# Patient Record
Sex: MALE | Race: WHITE | URBAN - NONMETROPOLITAN AREA
[De-identification: names, ages, dates, MRNs, and addresses within clinical notes are randomized per-mention and may not be internally consistent; named-entity substitution may affect disease eponyms.]

---

## 2024-12-04 ENCOUNTER — OFFICE VISIT (OUTPATIENT)
Dept: URGENT CARE | Age: 24
End: 2024-12-04

## 2024-12-04 VITALS
HEART RATE: 82 BPM | HEIGHT: 69 IN | SYSTOLIC BLOOD PRESSURE: 146 MMHG | RESPIRATION RATE: 16 BRPM | DIASTOLIC BLOOD PRESSURE: 81 MMHG | OXYGEN SATURATION: 97 % | TEMPERATURE: 98.3 F | BODY MASS INDEX: 34.07 KG/M2 | WEIGHT: 230 LBS

## 2024-12-04 DIAGNOSIS — J01.00 ACUTE MAXILLARY SINUSITIS, RECURRENCE NOT SPECIFIED: ICD-10-CM

## 2024-12-04 DIAGNOSIS — R11.2 NAUSEA AND VOMITING, UNSPECIFIED VOMITING TYPE: Primary | ICD-10-CM

## 2024-12-04 DIAGNOSIS — R05.9 COUGH, UNSPECIFIED TYPE: ICD-10-CM

## 2024-12-04 DIAGNOSIS — H65.00 ACUTE SEROUS OTITIS MEDIA, RECURRENCE NOT SPECIFIED, UNSPECIFIED LATERALITY: ICD-10-CM

## 2024-12-04 LAB
POC RAPID INFLUENZA A: NEGATIVE
POC RAPID INFLUENZA B: NEGATIVE
POC SARS-COV-2 AG BINAX: NORMAL

## 2024-12-04 RX ORDER — AMOXICILLIN 875 MG/1
875 TABLET, FILM COATED ORAL 2 TIMES DAILY
Qty: 20 TABLET | Refills: 0 | Status: SHIPPED | OUTPATIENT
Start: 2024-12-04 | End: 2024-12-14

## 2024-12-04 ASSESSMENT — ENCOUNTER SYMPTOMS
FATIGUE: 1
PSYCHIATRIC NEGATIVE: 1
COUGH: 1
HEADACHES: 1
SINUS PAIN: 1
CARDIOVASCULAR NEGATIVE: 1
HEMATOLOGIC/LYMPHATIC NEGATIVE: 1
SINUS PRESSURE: 1
MUSCULOSKELETAL NEGATIVE: 1
RHINORRHEA: 1
ENDOCRINE NEGATIVE: 1
ALLERGIC/IMMUNOLOGIC NEGATIVE: 1
EYES NEGATIVE: 1
VOMITING: 1

## 2024-12-04 NOTE — PROGRESS NOTES
Subjective   Patient ID: Gavin Arana is a 23 y.o. male. They present today with a chief complaint of Nasal Congestion, Earache (Bilateral ear pressure), Cough, Nausea, Headache, and Vomiting (Symptoms since sunday).    History of Present Illness    History provided by:  Patient   used: No    Earache   There is pain in both ears. The current episode started in the past 7 days. The problem occurs constantly. The problem has been unchanged. There has been no fever. The fever has been present for 3 to 4 days. The pain is moderate. Associated symptoms include coughing, headaches, rhinorrhea and vomiting. He has tried NSAIDs for the symptoms. The treatment provided mild relief.   Cough  The current episode started in the past 7 days. The problem has been unchanged. Associated symptoms include ear pain, headaches, nasal congestion, postnasal drip and rhinorrhea. The treatment provided no relief.   Headache  Pain location:  Generalized  Progression:  Worsening  Associated symptoms: cough, drainage, ear pain, fatigue, sinus pressure and vomiting    Vomiting  Associated symptoms: cough and headaches        Past Medical History  Allergies as of 12/04/2024    (No Known Allergies)       (Not in a hospital admission)       No past medical history on file.    No past surgical history on file.     reports that he has been smoking cigarettes. He has quit using smokeless tobacco. He reports current alcohol use. He reports current drug use. Drug: Marijuana.    Review of Systems  Review of Systems   Constitutional:  Positive for fatigue.   HENT:  Positive for ear pain, postnasal drip, rhinorrhea, sinus pressure and sinus pain.    Eyes: Negative.    Respiratory:  Positive for cough.    Cardiovascular: Negative.    Gastrointestinal:  Positive for vomiting.   Endocrine: Negative.    Genitourinary: Negative.    Musculoskeletal: Negative.    Skin: Negative.    Allergic/Immunologic: Negative.    Neurological:   "Positive for headaches.   Hematological: Negative.    Psychiatric/Behavioral: Negative.     All other systems reviewed and are negative.                                 Objective    Vitals:    12/04/24 1632   BP: 146/81   BP Location: Left arm   Patient Position: Sitting   BP Cuff Size: Large adult   Pulse: 82   Resp: 16   Temp: 36.8 °C (98.3 °F)   TempSrc: Oral   SpO2: 97%   Weight: 104 kg (230 lb)   Height: 1.753 m (5' 9\")     No LMP for male patient.    Physical Exam  Vitals and nursing note reviewed.   Constitutional:       Appearance: Normal appearance. He is normal weight.   HENT:      Right Ear: Tympanic membrane is erythematous.      Left Ear: Tympanic membrane is erythematous.      Nose: Nasal tenderness, mucosal edema, congestion and rhinorrhea present.      Right Sinus: Maxillary sinus tenderness present.      Left Sinus: Maxillary sinus tenderness present.   Cardiovascular:      Rate and Rhythm: Normal rate and regular rhythm.      Pulses: Normal pulses.      Heart sounds: Normal heart sounds.   Pulmonary:      Effort: Pulmonary effort is normal.      Breath sounds: Normal breath sounds.   Abdominal:      General: Bowel sounds are normal.      Palpations: Abdomen is soft.   Skin:     General: Skin is warm and dry.   Neurological:      General: No focal deficit present.      Mental Status: He is alert and oriented to person, place, and time. Mental status is at baseline.   Psychiatric:         Mood and Affect: Mood normal.         Behavior: Behavior normal.         Thought Content: Thought content normal.         Judgment: Judgment normal.         Procedures    Point of Care Test & Imaging Results from this visit  Results for orders placed or performed in visit on 12/04/24   POCT Covid-19 Rapid Antigen   Result Value Ref Range    POC KRIS-COV-2 AG  Presumptive negative test for SARS-CoV-2 (no antigen detected)     Presumptive negative test for SARS-CoV-2 (no antigen detected)   POCT Influenza A/B manually " resulted   Result Value Ref Range    POC Rapid Influenza A Negative Negative    POC Rapid Influenza B Negative Negative      No results found.    Diagnostic study results (if any) were reviewed by MEGA Lopez.    Assessment/Plan   Allergies, medications, history, and pertinent labs/EKGs/Imaging reviewed by MEGA Lopez.     Medical Decision Making  Medical Decision Making  At time of discharge patient was clinically well-appearing and HDS for outpatient management. The patient and/or family was educated regarding diagnosis, supportive care, OTC and Rx medications. The patient and/or family was given the opportunity to ask questions prior to discharge.  They verbalized understanding of my discussion of the plans for treatment, expected course, indications to return to  or seek further evaluation in ED, and the need for timely follow up as directed.   They were provided with a work/school excuse if requested.        Orders and Diagnoses  Diagnoses and all orders for this visit:  Nausea and vomiting, unspecified vomiting type  -     POCT Covid-19 Rapid Antigen  -     POCT Influenza A/B manually resulted  -     amoxicillin (Amoxil) 875 mg tablet; Take 1 tablet (875 mg) by mouth 2 times a day for 10 days.  Cough, unspecified type  -     POCT Covid-19 Rapid Antigen  -     POCT Influenza A/B manually resulted  -     amoxicillin (Amoxil) 875 mg tablet; Take 1 tablet (875 mg) by mouth 2 times a day for 10 days.  Acute serous otitis media, recurrence not specified, unspecified laterality  -     POCT Covid-19 Rapid Antigen  -     POCT Influenza A/B manually resulted  -     amoxicillin (Amoxil) 875 mg tablet; Take 1 tablet (875 mg) by mouth 2 times a day for 10 days.  Acute maxillary sinusitis, recurrence not specified  -     POCT Covid-19 Rapid Antigen  -     POCT Influenza A/B manually resulted  -     amoxicillin (Amoxil) 875 mg tablet; Take 1 tablet (875 mg) by mouth 2 times a day for 10  days.      Return to Urgent care if symptoms return or progress  Follow up with PCP in 1-2 weeks     Patient disposition: Home    Electronically signed by MEGA Lopez  5:12 PM

## 2024-12-04 NOTE — LETTER
December 4, 2024     Patient: Gavin Arana   YOB: 2000   Date of Visit: 12/4/2024       To Whom It May Concern:    Gavin Arana was seen in my clinic on 12/4/2024 at 4:30 pm. Please excuse Gavin for his absence from work on this day to make the appointment. Please excuse for 12/03/2024 - 12/05/2024 . May return 12/06/2024.    If you have any questions or concerns, please don't hesitate to call.         Sincerely,         Natasha Grossman, WILSON-CNP        CC: No Recipients

## 2024-12-27 ENCOUNTER — OFFICE VISIT (OUTPATIENT)
Dept: URGENT CARE | Age: 24
End: 2024-12-27

## 2024-12-27 VITALS
RESPIRATION RATE: 16 BRPM | WEIGHT: 236 LBS | BODY MASS INDEX: 34.96 KG/M2 | HEART RATE: 89 BPM | DIASTOLIC BLOOD PRESSURE: 85 MMHG | TEMPERATURE: 99.4 F | HEIGHT: 69 IN | SYSTOLIC BLOOD PRESSURE: 134 MMHG | OXYGEN SATURATION: 97 %

## 2024-12-27 DIAGNOSIS — J02.9 SORE THROAT: ICD-10-CM

## 2024-12-27 DIAGNOSIS — Z20.822 SUSPECTED COVID-19 VIRUS INFECTION: ICD-10-CM

## 2024-12-27 DIAGNOSIS — J10.1 INFLUENZA A: Primary | ICD-10-CM

## 2024-12-27 LAB
POC RAPID INFLUENZA A: POSITIVE
POC RAPID INFLUENZA B: NEGATIVE
POC RAPID STREP: NEGATIVE
POC SARS-COV-2 AG BINAX: NORMAL

## 2024-12-27 PROCEDURE — 87880 STREP A ASSAY W/OPTIC: CPT

## 2024-12-27 PROCEDURE — 99214 OFFICE O/P EST MOD 30 MIN: CPT

## 2024-12-27 PROCEDURE — 87811 SARS-COV-2 COVID19 W/OPTIC: CPT

## 2024-12-27 PROCEDURE — 87804 INFLUENZA ASSAY W/OPTIC: CPT

## 2024-12-27 PROCEDURE — 3008F BODY MASS INDEX DOCD: CPT

## 2024-12-27 RX ORDER — OSELTAMIVIR PHOSPHATE 75 MG/1
75 CAPSULE ORAL EVERY 12 HOURS
Qty: 10 CAPSULE | Refills: 0 | Status: SHIPPED | OUTPATIENT
Start: 2024-12-27 | End: 2025-01-01

## 2024-12-27 ASSESSMENT — ENCOUNTER SYMPTOMS
STRIDOR: 0
EYE ITCHING: 0
SHORTNESS OF BREATH: 0
VOMITING: 0
WHEEZING: 0
FATIGUE: 0
SINUS COMPLAINT: 1
HEADACHES: 0
EYE DISCHARGE: 0
FEVER: 0
ABDOMINAL PAIN: 0
COUGH: 1
DIZZINESS: 0
EYE PAIN: 0
CHEST TIGHTNESS: 0
CHILLS: 0
DIARRHEA: 0
SORE THROAT: 1

## 2024-12-27 ASSESSMENT — VISUAL ACUITY: OU: 1

## 2024-12-27 NOTE — PATIENT INSTRUCTIONS
Discharge instructions    Please follow up with your Primary Care Physician within the next 5-7 days.    It is important to take prescriptions as prescribed and complete all antibiotics.     If your symptoms worsen you are instructed to immediately go to the emergency room for reevaluation and further assessment.    If you develop any chest pain, SOB, or difficulty breathing you are instructed to go to the emergency room for reevaluation.    All discharge instructions will be provided and explained to the patient at discharge.    If you have any questions regarding your treatment plan please call the [facility] urgent care clinic.

## 2024-12-27 NOTE — PROGRESS NOTES
Subjective   Patient ID: Gavin Arana is a 24 y.o. male. They present today with a chief complaint of Sinus Problem (Sinus pressure), Headache, Cough (Getting some mucus up), Other (Very tired x 2 days), and Sore Throat.    History of Present Illness  Patient is a 24-year-old male presenting to the clinic with complaints of sinus congestion, sinus pressure, bilateral ear pressure, cough.  Symptoms were present for 48 hours now.  Symptoms started on [place].  Patient denies any chest pain or shortness of breath at this time.  Patient also has associated fatigue.      History provided by:  Patient  Sinus Problem  Associated symptoms: congestion, cough, ear pain and sore throat    Associated symptoms: no abdominal pain, no chest pain, no diarrhea, no fatigue, no fever, no headaches, no shortness of breath, no vomiting and no wheezing    Headache  Associated symptoms: congestion, cough, drainage, ear pain, sinus pressure and sore throat    Associated symptoms: no abdominal pain, no diarrhea, no dizziness, no eye pain, no fatigue, no fever and no vomiting    Cough  Associated symptoms include ear pain, postnasal drip and a sore throat. Pertinent negatives include no chest pain, chills, fever, headaches, shortness of breath or wheezing.   Sore Throat   Associated symptoms include congestion, coughing and ear pain. Pertinent negatives include no abdominal pain, diarrhea, ear discharge, headaches, shortness of breath, stridor or vomiting.       Past Medical History  Allergies as of 12/27/2024    (No Known Allergies)       (Not in a hospital admission)       History reviewed. No pertinent past medical history.    History reviewed. No pertinent surgical history.     reports that he has been smoking cigarettes. He has quit using smokeless tobacco. He reports current alcohol use. He reports current drug use. Drug: Marijuana.    Review of Systems  Review of Systems   Constitutional:  Negative for chills, fatigue and  "fever.   HENT:  Positive for congestion, ear pain, postnasal drip, sinus pressure and sore throat. Negative for ear discharge.    Eyes:  Negative for pain, discharge and itching.   Respiratory:  Positive for cough. Negative for chest tightness, shortness of breath, wheezing and stridor.    Cardiovascular:  Negative for chest pain.   Gastrointestinal:  Negative for abdominal pain, diarrhea and vomiting.   Neurological:  Negative for dizziness and headaches.                                  Objective    Vitals:    12/27/24 1352   BP: 134/85   BP Location: Left arm   Patient Position: Sitting   BP Cuff Size: Large adult   Pulse: 89   Resp: 16   Temp: 37.4 °C (99.4 °F)   TempSrc: Oral   SpO2: 97%   Weight: 107 kg (236 lb)   Height: 1.753 m (5' 9\")     No LMP for male patient.    Physical Exam  Constitutional:       General: He is awake. He is not in acute distress.     Appearance: Normal appearance. He is well-developed. He is not ill-appearing or toxic-appearing.   HENT:      Head: Normocephalic and atraumatic.      Right Ear: Tympanic membrane, ear canal and external ear normal.      Left Ear: Tympanic membrane, ear canal and external ear normal.      Nose: Congestion present.      Mouth/Throat:      Mouth: Mucous membranes are moist.   Eyes:      General: Vision grossly intact.      Conjunctiva/sclera: Conjunctivae normal.   Cardiovascular:      Rate and Rhythm: Normal rate and regular rhythm.      Heart sounds: Normal heart sounds, S1 normal and S2 normal. No murmur heard.     No friction rub. No gallop.   Pulmonary:      Effort: Pulmonary effort is normal. No respiratory distress.      Breath sounds: Normal breath sounds. No stridor. No wheezing, rhonchi or rales.   Skin:     General: Skin is warm.   Neurological:      General: No focal deficit present.      Mental Status: He is alert and oriented to person, place, and time. Mental status is at baseline.      Gait: Gait is intact.   Psychiatric:         Mood and " Affect: Mood normal.         Behavior: Behavior normal. Behavior is cooperative.         Procedures    Point of Care Test & Imaging Results from this visit  Results for orders placed or performed in visit on 12/27/24   POCT Covid-19 Rapid Antigen   Result Value Ref Range    POC KRIS-COV-2 AG  Presumptive negative test for SARS-CoV-2 (no antigen detected)     Presumptive negative test for SARS-CoV-2 (no antigen detected)   POCT Influenza A/B manually resulted   Result Value Ref Range    POC Rapid Influenza A Positive (A) Negative    POC Rapid Influenza B Negative Negative   POCT rapid strep A manually resulted   Result Value Ref Range    POC Rapid Strep Negative Negative      No results found.    Diagnostic study results (if any) were reviewed by [facility].    Assessment/Plan   Allergies, medications, history, and pertinent labs/EKGs/Imaging reviewed by Stan Delgadillo PA-C.     Medical Decision Making  Patient is a 24-year-old male presenting to the clinic with complaints of sinus congestion, sinus pressure, bilateral ear pressure, cough.  Symptoms were present for 48 hours now.  Symptoms started on [place].  Patient denies any chest pain or shortness of breath at this time.  Patient also has associated fatigue.  Vital signs in the clinic are stable.  Physical examination as above.  Positive for influenza A.  Treatment with Tamiflu. Discharge instructions. Please follow up with your Primary Care Physician within the next 5-7 days. It is important to take prescriptions as prescribed and complete all antibiotics. If your symptoms worsen you are instructed to immediately go to the emergency room for reevaluation and further assessment. If you develop any chest pain, SOB, or difficulty breathing you are instructed to go to the emergency room for reevaluation. All discharge instructions will be provided and explained to the patient at discharge. If you have any questions regarding your treatment plan please  call the [facility] urgent care clinic.     Orders and Diagnoses  Diagnoses and all orders for this visit:  Sore throat  -     POCT rapid strep A manually resulted  Suspected COVID-19 virus infection  -     POCT Covid-19 Rapid Antigen  Nonintractable headache, unspecified chronicity pattern, unspecified headache type  -     POCT Influenza A/B manually resulted      Medical Admin Record      Patient disposition: Home    Electronically signed by [facility]  2:13 PM

## 2024-12-29 ASSESSMENT — ENCOUNTER SYMPTOMS: SINUS PRESSURE: 1
